# Patient Record
(demographics unavailable — no encounter records)

---

## 2020-06-23 NOTE — EDM.PDOCBH
ED HPI GENERAL MEDICAL PROBLEM





- General


Chief Complaint: Behavioral/Psych


Stated Complaint: EVAL


Time Seen by Provider: 06/23/20 22:25


Source of Information: Reports: Patient, Family


History Limitations: Reports: No Limitations





- History of Present Illness


INITIAL COMMENTS - FREE TEXT/NARRATIVE: 





14 years old male child brought in by his grandma for mental health evaluation. 

Patient was angry at his brother earlier today. He get a hold of his grandpa gun

and threaten to kill himself. Denies any hallucination. Denies any drug use or 

alcohol use.





- Related Data


                                    Allergies











Allergy/AdvReac Type Severity Reaction Status Date / Time


 


No Known Drug Allergies Allergy  Other Verified 06/23/20 23:10











Home Meds: 


                                    Home Meds





guanFACINE HCl [Intuniv] 3 mg PO QPM 09/11/14 [History]


Cetirizine [ZyrTEC] 10 mg PO DAILY 06/24/20 [History]


Citalopram [Citalopram HBr] 20 mg PO DAILY 06/24/20 [History]


Lisdexamfetamine [Vyvanse] 70 mg PO DAILY 06/24/20 [History]


Melatonin 10 mg PO BEDTIME 06/24/20 [History]


Methylphenidate [Ritalin] 5 mg PO DAILY 06/24/20 [History]


risperiDONE 1 mg PO BID 06/24/20 [History]











ED ROS GENERAL





- Review of Systems


Review Of Systems: Comprehensive ROS is negative, except as noted in HPI.





ED EXAM, BEHAVIORAL HEALTH





- Physical Exam


Exam: See Below


Exam Limited By: No Limitations


General Appearance: Alert, WD/WN, No Apparent Distress


Head: Atraumatic, Normocephalic


Neck: Normal Inspection, Supple, Non-Tender, Full Range of Motion


Cardiovascular: Normal Peripheral Pulses, Regular Rate, Rhythm, No Edema, No 

Gallop, No JVD, No Murmur, No Rub


GI/Abdominal: Normal Bowel Sounds, Soft, Non-Tender, No Organomegaly, No 

Distention, No Abnormal Bruit, No Mass


Neurological: Alert, Normal Mood/Affect, CN II-XII Intact, Normal Cognition, 

Normal Gait, Normal Reflexes, No Motor/Sensory Deficits, Oriented x 3


Psychiatric: Alert, Normal Mood, Suicidal Thoughts





COURSE, BEHAVIORAL HEALTH COMP





- Course


Vital Signs: 


                                Last Vital Signs











Temp  36.2 C   06/23/20 23:15


 


Pulse  69   06/23/20 23:15


 


Resp  16   06/23/20 23:15


 


BP  90/48   06/23/20 23:15


 


Pulse Ox  100   06/23/20 23:15











Orders, Labs, Meds: 


                                Laboratory Tests











  06/23/20 06/23/20 06/23/20 Range/Units





  22:16 22:20 22:20 


 


WBC   5.0   (4.5-11.0)  K/uL


 


RBC   3.90 L   (4.30-5.90)  M/uL


 


Hgb   11.0 L   (12.0-15.0)  g/dL


 


Hct   34.0 L   (40.0-54.0)  %


 


MCV   87   (80-98)  fL


 


MCH   28   (27-31)  pg


 


MCHC   32   (32-36)  %


 


Plt Count   232   (150-400)  K/uL


 


Neut % (Auto)   34 L   (36-66)  %


 


Lymph % (Auto)   47 H   (24-44)  %


 


Mono % (Auto)   10 H   (2-6)  %


 


Eos % (Auto)   8 H   (2-4)  %


 


Baso % (Auto)   1   (0-1)  %


 


Sodium    140  (140-148)  mmol/L


 


Potassium    3.8  (3.6-5.2)  mmol/L


 


Chloride    104  (100-108)  mmol/L


 


Carbon Dioxide    28  (21-32)  mmol/L


 


Anion Gap    8.3  (5.0-14.0)  mmol/L


 


BUN    17  (7-18)  mg/dL


 


Creatinine    0.7 L  (0.8-1.3)  mg/dL


 


Est Cr Clr Drug Dosing    TNP  


 


Estimated GFR (MDRD)    TNP  


 


Glucose    99  ()  mg/dL


 


Calcium    8.8  (8.5-10.1)  mg/dL


 


Urine Opiates Screen  Negative    (NEGATIVE)  


 


Ur Oxycodone Screen  Negative    (NEGATIVE)  


 


Urine Methadone Screen  Negative    (NEGATIVE)  


 


Ur Propoxyphene Screen  Negative    (NEGATIVE)  


 


Ur Barbiturates Screen  Negative    (NEGATIVE)  


 


Ur Tricyclics Screen  Negative    (NEGATIVE)  


 


Ur Phencyclidine Scrn  Negative    (NEGATIVE)  


 


Ur Amphetamine Screen  Presumptive positive H    (NEGATIVE)  


 


U Methamphetamines Scrn  Negative    (NEGATIVE)  


 


Urine MDMA Screen  Negative    (NEGATIVE)  


 


U Benzodiazepines Scrn  Negative    (NEGATIVE)  


 


U Cocaine Metab Screen  Negative    (NEGATIVE)  


 


U Marijuana (THC) Screen  Negative    (NEGATIVE)  


 


Ethyl Alcohol     mg/dL














  06/23/20 Range/Units





  22:20 


 


WBC   (4.5-11.0)  K/uL


 


RBC   (4.30-5.90)  M/uL


 


Hgb   (12.0-15.0)  g/dL


 


Hct   (40.0-54.0)  %


 


MCV   (80-98)  fL


 


MCH   (27-31)  pg


 


MCHC   (32-36)  %


 


Plt Count   (150-400)  K/uL


 


Neut % (Auto)   (36-66)  %


 


Lymph % (Auto)   (24-44)  %


 


Mono % (Auto)   (2-6)  %


 


Eos % (Auto)   (2-4)  %


 


Baso % (Auto)   (0-1)  %


 


Sodium   (140-148)  mmol/L


 


Potassium   (3.6-5.2)  mmol/L


 


Chloride   (100-108)  mmol/L


 


Carbon Dioxide   (21-32)  mmol/L


 


Anion Gap   (5.0-14.0)  mmol/L


 


BUN   (7-18)  mg/dL


 


Creatinine   (0.8-1.3)  mg/dL


 


Est Cr Clr Drug Dosing   


 


Estimated GFR (MDRD)   


 


Glucose   ()  mg/dL


 


Calcium   (8.5-10.1)  mg/dL


 


Urine Opiates Screen   (NEGATIVE)  


 


Ur Oxycodone Screen   (NEGATIVE)  


 


Urine Methadone Screen   (NEGATIVE)  


 


Ur Propoxyphene Screen   (NEGATIVE)  


 


Ur Barbiturates Screen   (NEGATIVE)  


 


Ur Tricyclics Screen   (NEGATIVE)  


 


Ur Phencyclidine Scrn   (NEGATIVE)  


 


Ur Amphetamine Screen   (NEGATIVE)  


 


U Methamphetamines Scrn   (NEGATIVE)  


 


Urine MDMA Screen   (NEGATIVE)  


 


U Benzodiazepines Scrn   (NEGATIVE)  


 


U Cocaine Metab Screen   (NEGATIVE)  


 


U Marijuana (THC) Screen   (NEGATIVE)  


 


Ethyl Alcohol  10  mg/dL











Re-Assessment/Re-Exam: 





Patient was seen and examined shortly after arrival. Stable. Lab reviewed.


Medical Clearance: 





06/23/20 23:15


Patient was seen and examined shortly after arrival. The stable. Lab reviewed. 

Concern about active suicidal ideation. Patient would benefit from inpatient 

mental health evaluation. Medically cleared. Stable for transfer to [facility].


06/24/20 01:11





06/24/20 08:01








Departure





- Departure


Time of Disposition: 01:12


Disposition: DC/Tfer to Acute Hospital [facility]


Condition: Good


Clinical Impression: 


 Suicidal ideations








- Discharge Information


Referrals: 


Rajinder Kirkpatrick MD [Primary Care Provider] - 


Forms:  ED Department Discharge





Sepsis Event Note (ED)





- Focused Exam


Vital Signs: 


                                   Vital Signs











  Temp Pulse Resp BP Pulse Ox


 


 06/23/20 23:15  36.2 C  69  16  90/48  100














- Assessment/Plan


Plan: 





transfer to [facility]